# Patient Record
(demographics unavailable — no encounter records)

---

## 2024-10-11 NOTE — ASSESSMENT
[FreeTextEntry1] : Ms. RADHA BOSS is a 59 year  old woman. She presented to the office for the first time on 10/11/2024 , her primary is DELORES JARVIS . Radha is s 59 year lady h/o stroke on asa 81  left axillary 5 -10 m mass - tender no discharge left anterior abdominal wall 10 mm mass in the left anterior abdominal wall  previously squeezed    impression: inclusion cyst - left axilla left anterior abdominal wall  consented for excision of left anterior abdominal wall and axillary mass   We explained in great detail the pathophysiology of the disease process. We jan diagrams and discussed the workup for diagnosis and management. The various options were explained to the patient. The Risk , benefit and alternatives were discussed. We discussed recovery and possible complications. The Post operative care was explained to the patient. She was counselled on diet , exercise and wound care. We discussed the pathology and surgery with her. We discussed for over ~45 min, including her present medical conditions, medications and if they need to be changed, the medications she is on and various surgical and non-surgical options. The Risk, benefit and alternatives were discussed. We discussed recovery and possible complications. her radiological images and labs were independently reviewed in the PACS by me. The Images were reviewed with her .   We discussed the importance of close follow up. We informed that she needs to follow up in OR . We also informed that she can call us if anything changes or has any questions.     Jes Hernandez MD Minimally Invasive Surgery Foregut and [facility]  [facility]. [address] , [address], [place] [address] Phone: [phone] Fax: [phone]

## 2024-10-11 NOTE — HISTORY OF PRESENT ILLNESS
[de-identified] : Ms. RADHA BOSS is a 59 year  old woman. She presented to the office for the first time on 10/11/2024 , her primary is DELORES JARVIS . Radha is s 59 year lady h/o stroke on asa 81  left axillary 5 -10 m mass - tender no discharge left anterior abdominal wall 10 mm mass in the left anterior abdominal wall  previously squeezed

## 2024-10-11 NOTE — PHYSICAL EXAM
[JVD] : no jugular venous distention  [Purpura] : no purpura  [Alert] : alert [Calm] : calm [de-identified] : Normal  [de-identified] : Normal  [de-identified] : Normal

## 2024-11-27 NOTE — DATA REVIEWED
[FreeTextEntry1] : Reviewed prior office notes and operative report. Pathology has not been finalized yet.

## 2024-11-27 NOTE — HISTORY OF PRESENT ILLNESS
[de-identified] : Ms. RADHA BOSS is a 59 year  old woman. She presented to the office for the first time on 10/11/2024 , her primary is DELORES JARVIS . Radha is s 59 year lady h/o stroke on asa 81  left axillary 5 -10 m mass - tender no discharge left anterior abdominal wall 10 mm mass in the left anterior abdominal wall  previously squeezed   11/27: The patient returns to the office for a post-operative visit after her recent excisions of left axilla and anterior abdominal wall mass on 11/11. She looks and feels well.

## 2024-11-27 NOTE — ASSESSMENT
[FreeTextEntry1] : Ms. RADHA BOSS is a 59 year  old woman. She presented to the office for the first time on 10/11/2024 , her primary is DELORES JARVIS . Radha is s 59 year lady h/o stroke on asa 81  left axillary 5 -10 m mass - tender no discharge left anterior abdominal wall 10 mm mass in the left anterior abdominal wall  previously squeezed   11/27: The patient returns to the office for a post-operative visit after her recent excisions of left axilla and anterior abdominal wall mass on 11/11. No post-operative problems noted. She has no complaints regarding the surgery. She did not require prescription pain medication. Pathology has not been finalized. We will call her with final pathology results. Full local care and activity restrictions were reviewed. She will return to the office 1 month for re-exam, or sooner as needed. She is happy with the assessment and plan. All of her questions were answered.   Impression: Healing well s/p excisions   We discussed the importance of close follow up. We informed that she needs to follow up in 1 month.  We also informed that she can call us if anything changes or has any questions.

## 2024-11-27 NOTE — PHYSICAL EXAM
[de-identified] : Healthy [de-identified] : Well-healing surgical incision to the abdominal wall, left epigastric region. Well-healing surgical incision in the left axilla. Both sites are clean and dry, with no evidence of hematoma or infection.

## 2025-01-31 NOTE — ASSESSMENT
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  This is not malignant and therefore there is no risk of spread.  However, there may be local recurrence in certain cases.  As she had a negative margin excision no further treatment is needed at this time.  We will surveilled her regularly for now.  We will see her back in 3 months.  We instructed to call for an earlier appointment if any other issues arise in the interim.

## 2025-01-31 NOTE — HISTORY OF PRESENT ILLNESS
[de-identified] : This patient is status post excision of an axillary mass by Dr. Hernandez which revealed a pleomorphic adenoma.  This was done a couple of months ago.  She has no complaints with regards to the incision.  She denies any recurrent mass or any other issues.  She presents for discussion of further follow-up.

## 2025-01-31 NOTE — PHYSICAL EXAM
[de-identified] : She is a well-healed incision in her left axilla with no masses underlying it.  There is no lymphadenopathy.

## 2025-04-18 NOTE — ASSESSMENT
[FreeTextEntry1] : The patient is doing well at this time.  She has no evidence of recurrence of this benign tumor.  I will see her back again in about 6 months just for surveillance.  I believe if she is doing well at that time that no further follow-up is needed.

## 2025-04-18 NOTE — PHYSICAL EXAM
[de-identified] : She has a well-healed incision in her left axilla without any masses or nodules.  She has no axillary adenopathy.

## 2025-04-18 NOTE — HISTORY OF PRESENT ILLNESS
[de-identified] : This patient a pleomorphic adenoma of the left axilla which was excised by Dr. Hernandez in 2020 for November.  The margins were clear.  She presents for routine follow-up.  She only complains of occasional pain in the area.  She denies any new masses or other issues.